# Patient Record
Sex: FEMALE | Race: BLACK OR AFRICAN AMERICAN
[De-identification: names, ages, dates, MRNs, and addresses within clinical notes are randomized per-mention and may not be internally consistent; named-entity substitution may affect disease eponyms.]

---

## 2018-08-05 ENCOUNTER — HOSPITAL ENCOUNTER (EMERGENCY)
Dept: HOSPITAL 45 - C.EDB | Age: 33
Discharge: HOME | End: 2018-08-05
Payer: COMMERCIAL

## 2018-08-05 VITALS — SYSTOLIC BLOOD PRESSURE: 138 MMHG | DIASTOLIC BLOOD PRESSURE: 80 MMHG | HEART RATE: 81 BPM | OXYGEN SATURATION: 100 %

## 2018-08-05 VITALS
WEIGHT: 251.55 LBS | BODY MASS INDEX: 37.26 KG/M2 | HEIGHT: 69.02 IN | BODY MASS INDEX: 37.26 KG/M2 | WEIGHT: 251.55 LBS | HEIGHT: 69.02 IN

## 2018-08-05 VITALS — TEMPERATURE: 98.24 F

## 2018-08-05 DIAGNOSIS — Z88.8: ICD-10-CM

## 2018-08-05 DIAGNOSIS — J45.909: ICD-10-CM

## 2018-08-05 DIAGNOSIS — D64.9: ICD-10-CM

## 2018-08-05 DIAGNOSIS — Z79.4: ICD-10-CM

## 2018-08-05 DIAGNOSIS — I10: ICD-10-CM

## 2018-08-05 DIAGNOSIS — I48.91: ICD-10-CM

## 2018-08-05 DIAGNOSIS — Z79.899: ICD-10-CM

## 2018-08-05 DIAGNOSIS — Z91.040: ICD-10-CM

## 2018-08-05 DIAGNOSIS — E11.9: ICD-10-CM

## 2018-08-05 DIAGNOSIS — R00.2: Primary | ICD-10-CM

## 2018-08-05 LAB
BASOPHILS # BLD: 0.01 K/UL (ref 0–0.2)
BASOPHILS NFR BLD: 0.3 %
BUN SERPL-MCNC: 8 MG/DL (ref 7–18)
CALCIUM SERPL-MCNC: 8.5 MG/DL (ref 8.5–10.1)
CO2 SERPL-SCNC: 29 MMOL/L (ref 21–32)
CREAT SERPL-MCNC: 0.73 MG/DL (ref 0.6–1.2)
EOS ABS #: 0.06 K/UL (ref 0–0.5)
EOSINOPHIL NFR BLD AUTO: 191 K/UL (ref 130–400)
GLUCOSE SERPL-MCNC: 238 MG/DL (ref 70–99)
HCT VFR BLD CALC: 33.3 % (ref 37–47)
HGB BLD-MCNC: 10.5 G/DL (ref 12–16)
IG#: 0 K/UL (ref 0–0.02)
IMM GRANULOCYTES NFR BLD AUTO: 48 %
LYMPHOCYTES # BLD: 1.69 K/UL (ref 1.2–3.4)
MCH RBC QN AUTO: 26.2 PG (ref 25–34)
MCHC RBC AUTO-ENTMCNC: 31.5 G/DL (ref 32–36)
MCV RBC AUTO: 83 FL (ref 80–100)
MONO ABS #: 0.32 K/UL (ref 0.11–0.59)
MONOCYTES NFR BLD: 9.1 %
NEUT ABS #: 1.44 K/UL (ref 1.4–6.5)
NEUTROPHILS # BLD AUTO: 1.7 %
NEUTROPHILS NFR BLD AUTO: 40.9 %
PMV BLD AUTO: 9.7 FL (ref 7.4–10.4)
POTASSIUM SERPL-SCNC: 3.6 MMOL/L (ref 3.5–5.1)
RED CELL DISTRIBUTION WIDTH CV: 13.6 % (ref 11.5–14.5)
RED CELL DISTRIBUTION WIDTH SD: 41.6 FL (ref 36.4–46.3)
SODIUM SERPL-SCNC: 138 MMOL/L (ref 136–145)
WBC # BLD AUTO: 3.52 K/UL (ref 4.8–10.8)

## 2018-08-05 NOTE — DIAGNOSTIC IMAGING REPORT
CHEST ONE VIEW PORTABLE



CLINICAL HISTORY: Chest Pain pain



COMPARISON STUDY:  No previous studies for comparison.



FINDINGS: The bones soft tissues and hemidiaphragms are normal. The

cardiomediastinal silhouette is normal. The lungs are clear. The pulmonary

vasculature is normal. 



IMPRESSION:  Negative chest. 











The above report was generated using voice recognition software.  It may contain

grammatical, syntax or spelling errors.









Electronically signed by:  Camilo Butler M.D.

8/5/2018 6:45 AM



Dictated Date/Time:  8/5/2018 6:45 AM

## 2018-08-05 NOTE — EMERGENCY ROOM VISIT NOTE
History


Report prepared by Miles:  Sybil Hurt


Under the Supervision of:  Dr. Mikey Hamilton M.D.


First contact with patient:  02:06


Chief Complaint:  CARDIAC ASSESSMENT


Stated Complaint:  FLUTTERING HEARTBEAT





History of Present Illness


The patient is a 32 year old female who presents to the Emergency Room with 

complaints of an episode of her heart fluttering starting prior to arrival. The 

patient states that she has a history of atrial fibrillation and takes 

Metoprolol for it. She states that she goes into atrial fibrillation 2-3 times 

a week. She states that she normally doesn't come to the ED, but did tonight 

because she became dizzy, lightheaded, and chest pain. She states that her 

heart was also beating much faster with this episode. She reports that it awoke 

her from her sleep tonight. She notes that the last in she came to the ED was 

with similar symptoms a month ago. The patient complains of leg swelling. She 

notes that she is on potassium pills, but has not been taking them since she is 

visiting [place] and forgot them at home. The patient denies current 

chest pain, history of heart failure, history of thyroid problems, a history of 

blood clots, use of a blood thinner, abnormal eating/drinking, and burning with 

urination.





   Source of History:  patient


   Onset:  prior to arrival


   Position:  chest


   Quality:  other (heart fluttering)


   Timing:  other (episode)


   Associated Symptoms:  No chest pain, No urinary symptoms


Note:


The patient complains of dizziness, lightheadedness, and leg swelling. The 

patient denies abnormal eating/drinking.





Review of Systems


See HPI for pertinent positives & negatives. A total of 10 systems reviewed and 

were otherwise negative.





Past Medical & Surgical


Medical Problems:


(1) A-fib


(2) Asthma


(3) Diabetes


(4) HTN (hypertension)


Surgical Problems:


(1) Hx of gastric bypass








Family History





Patient reports no known family medical history.





Social History


Smoking Status:  Former Smoker


Marital Status:  single


Housing Status:  lives with family





Current/Historical Medications


Scheduled


Cholecalciferol (Vitamin D3), Unknown Dose PO DAILY


Cyanocobalamin (Vitamin B12 100 Mcg), 100 MCG IM MONTHLY


Insulin Glargine (Basaglar Kwikpen), 40 UNITS SQ DAILY


Lisinopril (Zestril), 5 MG PO DAILY


Metoprolol Succ (Toprol Xl) (Toprol-Xl), 25 MG PO DAILY


Multivitamin (Multivitamin), 1 TAB PO DAILY


Omeprazole (Prilosec), 10 MG PO DAILY


Pregabalin (Lyrica), 25 MG PO BID


[insulin humalog], 1 DOSE SQ AS DIRECTED





Scheduled PRN


Albuterol Hfa (Ventolin Hfa), 2-4 PUFFS INH AS DIRECTED PRN for SOB/Wheezing


Albuterol Sulfate (Albuterol Sulfate), Unknown Dose NEB AS DIRECTED PRN for 

Shortness of Breath





Allergies


Coded Allergies:  


     Latex1 -Allergic Contact Dermititis (Verified  Allergy, Intermediate, 

itching, 8/5/18)


     Sitagliptin (Verified  Allergy, Intermediate, itching, 8/5/18)





Physical Exam


Vital Signs











  Date Time  Temp Pulse Resp B/P (MAP) Pulse Ox O2 Delivery O2 Flow Rate FiO2


 


8/5/18 04:45  81 18 138/80 100   


 


8/5/18 02:20  70      


 


8/5/18 02:07     99 Room Air  


 


8/5/18 01:51 36.8 81 16 155/92 99 Room Air  











Physical Exam


GENERAL: Patient is well appearing and in no acute distress.


EYES: No scleral icterus, unremarkable pupils.


ENT: Mucous membranes moist, no nasal congestion.


NECK: No masses appreciated, no meningismus, trachea is midline.


RESPIRATORY: No dyspnea. Clear to auscultation and equal bilaterally. No wheeze

, no rhonchi.


CARDIOVASCULAR: Regular rate and rhythm.  No murmurs, rubs, gallops appreciated.


GASTROINTESTINAL: Abdomen soft, nontender, no peritonitis.  Bowel sounds 

positive.  No masses appreciated.


BACK: No midline tenderness, no CVA tenderness


EXTREMITIES: Normal motion all extremities, no cyanosis, no edema.


NEUROLOGIC: Alert and oriented, no acute motor or sensory deficits, no focal 

weakness, cranial nerves grossly intact.


SKIN: No rash, no jaundice, no diaphoresis.





Medical Decision & Procedures


Laboratory Results


8/5/18 02:41








Red Blood Count 4.01, Mean Corpuscular Volume 83.0, Mean Corpuscular Hemoglobin 

26.2, Mean Corpuscular Hemoglobin Concent 31.5, Mean Platelet Volume 9.7, 

Neutrophils (%) (Auto) 40.9, Lymphocytes (%) (Auto) 48.0, Monocytes (%) (Auto) 

9.1, Eosinophils (%) (Auto) 1.7, Basophils (%) (Auto) 0.3, Neutrophils # (Auto) 

1.44, Lymphocytes # (Auto) 1.69, Monocytes # (Auto) 0.32, Eosinophils # (Auto) 

0.06, Basophils # (Auto) 0.01





8/5/18 02:41

















Test


  8/5/18


02:41


 


White Blood Count


  3.52 K/uL


(4.8-10.8)


 


Red Blood Count


  4.01 M/uL


(4.2-5.4)


 


Hemoglobin


  10.5 g/dL


(12.0-16.0)


 


Hematocrit 33.3 % (37-47) 


 


Mean Corpuscular Volume


  83.0 fL


()


 


Mean Corpuscular Hemoglobin


  26.2 pg


(25-34)


 


Mean Corpuscular Hemoglobin


Concent 31.5 g/dl


(32-36)


 


Platelet Count


  191 K/uL


(130-400)


 


Mean Platelet Volume


  9.7 fL


(7.4-10.4)


 


Neutrophils (%) (Auto) 40.9 % 


 


Lymphocytes (%) (Auto) 48.0 % 


 


Monocytes (%) (Auto) 9.1 % 


 


Eosinophils (%) (Auto) 1.7 % 


 


Basophils (%) (Auto) 0.3 % 


 


Neutrophils # (Auto)


  1.44 K/uL


(1.4-6.5)


 


Lymphocytes # (Auto)


  1.69 K/uL


(1.2-3.4)


 


Monocytes # (Auto)


  0.32 K/uL


(0.11-0.59)


 


Eosinophils # (Auto)


  0.06 K/uL


(0-0.5)


 


Basophils # (Auto)


  0.01 K/uL


(0-0.2)


 


RDW Standard Deviation


  41.6 fL


(36.4-46.3)


 


RDW Coefficient of Variation


  13.6 %


(11.5-14.5)


 


Immature Granulocyte % (Auto) 0.0 % 


 


Immature Granulocyte # (Auto)


  0.00 K/uL


(0.00-0.02)


 


D-Dimer


  210 ug/L FEU


(0-500)


 


Anion Gap


  4.0 mmol/L


(3-11)


 


Est Creatinine Clear Calc


Drug Dose 149.1 ml/min 


 


 


Estimated GFR (


American) 126.3 


 


 


Estimated GFR (Non-


American 109.0 


 


 


BUN/Creatinine Ratio 11.2 (10-20) 


 


Calcium Level


  8.5 mg/dl


(8.5-10.1)


 


Troponin I


  < 0.015 ng/ml


(0-0.045)


 


Thyroid Stimulating Hormone


(TSH) 3.820 uIu/ml


(0.300-4.500)





Laboratory results as reviewed by me.





Medications Administered











 Medications


  (Trade)  Dose


 Ordered  Sig/Caitlyn


 Route  Start Time


 Stop Time Status Last Admin


Dose Admin


 


 Sodium Chloride  1,000 ml @ 


 999 mls/hr  Q1H1M STAT


 IV  8/5/18 02:36


 8/5/18 03:36 DC 8/5/18 02:57


999 MLS/HR











ECG Per My Interpretation


Indication:  chest pain


Rate (beats per minute):  76


Rhythm:  normal sinus


Findings:  no acute ischemic change, no ectopy, other (QT-c 447)





ED Course


0227: The patient was evaluated in room B2. A complete history and physical 

exam was performed. 





0236: Ordered NSS 1000 ml @ 999 mls/hr IV.





0437: Reevaluated the patient and she is feeling better. She has had no further 

palpitations. Discussed results, including her mild anemia and normal potassium

, and discharge instructions: She verbalized understanding and agreement. She 

will follow up with her PCP.  The patient is ready for discharge.





Medical Decision


Differential: NSR, SVT, PACs, PVCs, Cardiac Dysrhythmia, Endocrine Dysfunction, 

Electrolyte/Metabolic Abnormality, Pulmonary Embolism, Infectious, GI, amongst 

other pathologies entertained.





32 yr old female awoke with palpitations, chest pain and lightheaded this 

morning no resolved.  Occurs quite regularly and reports previous diagnosis of 

afib but not on blood thinners.  Looks well and currently in NSR.  Recent 

travel thus dimer which was negative and otherwise low risk PE.  EKG/Trop 

unremarkable.  Electrolytes look good.  Anemia known to patient from previous 

visits.  Stable throughout stay.  Will follow up with PCP once home.





Medication Reconcilliation


Current Medication List:  was personally reviewed by me





Blood Pressure Screening


Patient's blood pressure:  Elevated blood pressure


Blood pressure disposition:  Elevated BP felt to be situational





Impression





 Primary Impression:  


 Palpitations


 Additional Impression:  


 Anemia





Scribe Attestation


The scribe's documentation has been prepared under my direction and personally 

reviewed by me in its entirety. I confirm that the note above accurately 

reflects all work, treatment, procedures, and medical decision making performed 

by me.





Departure Information


Dispostion


Home / Self-Care





Referrals


No Doctor, Assigned (PCP)





Forms


IMPORTANT VISIT INFORMATION





Patient Instructions


ED Palpitations, My [facility]





Problem Qualifiers